# Patient Record
Sex: FEMALE | Race: WHITE | Employment: FULL TIME | URBAN - METROPOLITAN AREA
[De-identification: names, ages, dates, MRNs, and addresses within clinical notes are randomized per-mention and may not be internally consistent; named-entity substitution may affect disease eponyms.]

---

## 2020-12-19 ENCOUNTER — HOSPITAL ENCOUNTER (OUTPATIENT)
Age: 55
Setting detail: SPECIMEN
Discharge: HOME | End: 2020-12-19
Payer: COMMERCIAL

## 2020-12-19 PROCEDURE — U0003 INFECTIOUS AGENT DETECTION BY NUCLEIC ACID (DNA OR RNA); SEVERE ACUTE RESPIRATORY SYNDROME CORONAVIRUS 2 (SARS-COV-2) (CORONAVIRUS DISEASE [COVID-19]), AMPLIFIED PROBE TECHNIQUE, MAKING USE OF HIGH THROUGHPUT TECHNOLOGIES AS DESCRIBED BY CMS-2020-01-R: HCPCS

## 2020-12-19 PROCEDURE — 99201 HC NEW PT, OUTPT VISIT LEVEL 1: CPT

## 2020-12-21 LAB
PERFORMING LAB: ABNORMAL
REPORT: ABNORMAL
SARS-COV-2: DETECTED

## 2022-12-16 ENCOUNTER — HOSPITAL ENCOUNTER (EMERGENCY)
Age: 57
Discharge: HOME | End: 2022-12-16
Payer: COMMERCIAL

## 2022-12-16 VITALS
OXYGEN SATURATION: 100 % | HEART RATE: 96 BPM | RESPIRATION RATE: 18 BRPM | SYSTOLIC BLOOD PRESSURE: 132 MMHG | TEMPERATURE: 100 F | DIASTOLIC BLOOD PRESSURE: 62 MMHG

## 2022-12-16 DIAGNOSIS — J06.9 VIRAL URI: Primary | ICD-10-CM

## 2022-12-16 LAB
FLU A ANTIGEN: NEGATIVE
INFLUENZA B AG, EIA: NEGATIVE

## 2022-12-16 PROCEDURE — 99213 OFFICE O/P EST LOW 20 MIN: CPT

## 2022-12-16 PROCEDURE — 87804 INFLUENZA ASSAY W/OPTIC: CPT

## 2022-12-16 PROCEDURE — 99202 OFFICE O/P NEW SF 15 MIN: CPT | Performed by: NURSE PRACTITIONER

## 2022-12-16 RX ORDER — ONDANSETRON 4 MG/1
4 TABLET, ORALLY DISINTEGRATING ORAL EVERY 8 HOURS PRN
Qty: 9 TABLET | Refills: 0 | Status: SHIPPED | OUTPATIENT
Start: 2022-12-16

## 2022-12-16 RX ORDER — CITALOPRAM 10 MG/1
10 TABLET ORAL DAILY
COMMUNITY

## 2022-12-16 ASSESSMENT — ENCOUNTER SYMPTOMS
NAUSEA: 1
SORE THROAT: 0
SHORTNESS OF BREATH: 0
CHEST TIGHTNESS: 0
VOMITING: 0
EYE REDNESS: 0
COUGH: 1
ABDOMINAL PAIN: 0
EYE ITCHING: 0
DIARRHEA: 0
SINUS PRESSURE: 0

## 2022-12-16 NOTE — ED PROVIDER NOTES
[address] Fritz       Chief Complaint   Patient presents with    Generalized Body Aches    Fever    Nausea       Nurses Notes reviewed and I agree except as noted in the HPI. HISTORY OF PRESENT ILLNESS   Russ Shetty is a 62 y.o. female who presents to the urgent care for evaluation. Flu like symptoms started yesterday. Taking Theraflu as needed. The patient/patient representative has no other acute complaints at this time. REVIEW OF SYSTEMS     Review of Systems   Constitutional:  Positive for fever. Negative for chills and fatigue. HENT:  Negative for congestion, ear pain, sinus pressure and sore throat. Eyes:  Negative for redness and itching. Respiratory:  Positive for cough. Negative for chest tightness and shortness of breath. Cardiovascular:  Negative for chest pain. Gastrointestinal:  Positive for nausea. Negative for abdominal pain, diarrhea and vomiting. Musculoskeletal:  Positive for myalgias. Skin:  Negative for rash. Allergic/Immunologic: Negative for environmental allergies and food allergies. Neurological:  Negative for headaches. Hematological:  Negative for adenopathy. PAST MEDICAL HISTORY         Diagnosis Date    Depression     DVT (deep vein thrombosis) in pregnancy     PTSD (post-traumatic stress disorder)        SURGICAL HISTORY     Patient  has a past surgical history that includes hernia repair and Dilation and curettage of uterus. CURRENT MEDICATIONS       Previous Medications    CITALOPRAM (CELEXA) 10 MG TABLET    Take 10 mg by mouth daily       ALLERGIES     Patient is has No Known Allergies. FAMILY HISTORY     Patient'sfamily history is not on file. SOCIAL HISTORY     Patient  reports that she has been smoking cigarettes. She has a 15.00 pack-year smoking history. She has never used smokeless tobacco. She reports current alcohol use.  She reports that she does not use drugs.    PHYSICAL EXAM     ED TRIAGE VITALS  BP: 134/60, Temp: 100 °F (37.8 °C), Heart Rate: (!) 109, Resp: 20, SpO2: 100 %  Physical Exam  Vitals and nursing note reviewed. Constitutional:       General: She is not in acute distress. Appearance: Normal appearance. She is well-developed and well-groomed. HENT:      Head: Normocephalic and atraumatic. Right Ear: External ear normal.      Left Ear: External ear normal.      Nose: Nose normal.      Mouth/Throat:      Lips: Pink. Mouth: Mucous membranes are moist.   Eyes:      Conjunctiva/sclera: Conjunctivae normal.      Right eye: Right conjunctiva is not injected. Left eye: Left conjunctiva is not injected. Pupils: Pupils are equal.   Cardiovascular:      Rate and Rhythm: Normal rate. Heart sounds: Normal heart sounds. Pulmonary:      Effort: Pulmonary effort is normal. No respiratory distress. Breath sounds: Normal breath sounds. Musculoskeletal:      Cervical back: Normal range of motion. Skin:     General: Skin is warm and dry. Findings: No rash (on exposed surfaces). Neurological:      Mental Status: She is alert and oriented to person, place, and time. Gait: Gait is intact. Psychiatric:         Mood and Affect: Mood normal.         Speech: Speech normal.         Behavior: Behavior is cooperative. DIAGNOSTIC RESULTS   Labs:  Abnormal Labs Reviewed - No abnormal labs to display     IMAGING:  No orders to display     URGENT CARE COURSE:     Vitals:    12/16/22 1126   BP: 134/60   Pulse: (!) 109   Resp: 20   Temp: 100 °F (37.8 °C)   SpO2: 100%       Medications - No data to display  PROCEDURES:  FINALIMPRESSION      1.  Viral URI        DISPOSITION/PLAN   DISPOSITION Decision To Discharge 12/16/2022 11:57:43 AM        ED Course as of 12/16/22 1159   Fri Dec 16, 2022   1155 Flu A Antigen: Negative [HA]   1155 Influenza B Ag, EIA: Negative [HA]      ED Course User Index  [address] [facility], [facility] - [facility] Problem List Items Addressed This Visit    None  Visit Diagnoses       Viral URI    -  Primary    Relevant Medications    ondansetron (ZOFRAN-ODT) 4 MG disintegrating tablet            Physical assessment findings, diagnostic testing(s) if applicable, and vital signs reviewed with patient/patient representative. Differential diagnosis(s) discussed with patient/patient representative. Prescription medications and/or over-the-counter medications for symptom management discussed. Patient is to follow-up with family care provider in 2-3 days if no improvement. If symptoms should worsen or change, go to the ED. Patient/patient representative is aware of care plan, questions answered, verbalizes understanding and is in agreement. Printed instructions attached to after visit summary. If COVID-19 positive or COVID-19 by PCR is pending at time of discharge patient is to quarantine/isolate according to [facility] guidelines. PATIENT REFERRED TO:  [address] [place] [address],[address] [address] [address]  Schedule an appointment as soon as possible for a visit in 3 days  if you do not have a family provider, If symptoms change/worsen, go to the [address] [place] [address], APR - CNP    Please note that some or all of this chart was generated using [employer] voice recognition software. Although every effort was made to ensure the accuracy of this automated transcription, some errors in transcription may have occurred.         LESLIE Hernandez CNP  12/16/22 3160

## 2022-12-16 NOTE — Clinical Note
Joyce Vivas was seen and treated in our emergency department on 12/16/2022. She may return to work on 12/19/2022. If you have any questions or concerns, please don't hesitate to call.       Abhijeet Norton, APRN - CNP

## 2023-01-05 ENCOUNTER — OFFICE VISIT (OUTPATIENT)
Dept: FAMILY MEDICINE CLINIC | Age: 58
End: 2023-01-05
Payer: COMMERCIAL

## 2023-01-05 VITALS
SYSTOLIC BLOOD PRESSURE: 114 MMHG | RESPIRATION RATE: 18 BRPM | OXYGEN SATURATION: 95 % | WEIGHT: 144 LBS | TEMPERATURE: 97.3 F | DIASTOLIC BLOOD PRESSURE: 80 MMHG | HEART RATE: 95 BPM

## 2023-01-05 DIAGNOSIS — S03.00XA DISLOCATION OF TEMPOROMANDIBULAR JOINT, INITIAL ENCOUNTER: ICD-10-CM

## 2023-01-05 DIAGNOSIS — M54.2 NECK PAIN: Primary | ICD-10-CM

## 2023-01-05 PROCEDURE — 99203 OFFICE O/P NEW LOW 30 MIN: CPT | Performed by: STUDENT IN AN ORGANIZED HEALTH CARE EDUCATION/TRAINING PROGRAM

## 2023-01-05 RX ORDER — LATANOPROST 50 UG/ML
SOLUTION/ DROPS OPHTHALMIC
COMMUNITY
Start: 2022-12-07

## 2023-01-05 RX ORDER — FERROUS SULFATE 325(65) MG
TABLET ORAL
COMMUNITY
Start: 2022-12-23

## 2023-01-05 RX ORDER — CHOLECALCIFEROL (VITAMIN D3) 125 MCG
CAPSULE ORAL
COMMUNITY
Start: 2022-12-06

## 2023-01-05 RX ORDER — LORAZEPAM 0.5 MG/1
TABLET ORAL
COMMUNITY
Start: 2022-11-17

## 2023-01-05 RX ORDER — CLINDAMYCIN HYDROCHLORIDE 300 MG/1
CAPSULE ORAL
COMMUNITY
Start: 2022-12-27

## 2023-01-05 SDOH — ECONOMIC STABILITY: FOOD INSECURITY: WITHIN THE PAST 12 MONTHS, YOU WORRIED THAT YOUR FOOD WOULD RUN OUT BEFORE YOU GOT MONEY TO BUY MORE.: NEVER TRUE

## 2023-01-05 SDOH — ECONOMIC STABILITY: TRANSPORTATION INSECURITY
IN THE PAST 12 MONTHS, HAS LACK OF TRANSPORTATION KEPT YOU FROM MEETINGS, WORK, OR FROM GETTING THINGS NEEDED FOR DAILY LIVING?: NO

## 2023-01-05 SDOH — ECONOMIC STABILITY: FOOD INSECURITY: WITHIN THE PAST 12 MONTHS, THE FOOD YOU BOUGHT JUST DIDN'T LAST AND YOU DIDN'T HAVE MONEY TO GET MORE.: NEVER TRUE

## 2023-01-05 SDOH — ECONOMIC STABILITY: TRANSPORTATION INSECURITY
IN THE PAST 12 MONTHS, HAS THE LACK OF TRANSPORTATION KEPT YOU FROM MEDICAL APPOINTMENTS OR FROM GETTING MEDICATIONS?: NO

## 2023-01-05 ASSESSMENT — PATIENT HEALTH QUESTIONNAIRE - PHQ9
2. FEELING DOWN, DEPRESSED OR HOPELESS: 0
SUM OF ALL RESPONSES TO PHQ9 QUESTIONS 1 & 2: 0
SUM OF ALL RESPONSES TO PHQ QUESTIONS 1-9: 0
1. LITTLE INTEREST OR PLEASURE IN DOING THINGS: 0
SUM OF ALL RESPONSES TO PHQ QUESTIONS 1-9: 0

## 2023-01-05 ASSESSMENT — ENCOUNTER SYMPTOMS
CONSTIPATION: 0
DIARRHEA: 0
ABDOMINAL PAIN: 0
SHORTNESS OF BREATH: 0
BACK PAIN: 0
NAUSEA: 0
VOMITING: 0
COUGH: 0
RHINORRHEA: 0

## 2023-01-05 ASSESSMENT — SOCIAL DETERMINANTS OF HEALTH (SDOH): HOW HARD IS IT FOR YOU TO PAY FOR THE VERY BASICS LIKE FOOD, HOUSING, MEDICAL CARE, AND HEATING?: NOT HARD AT ALL

## 2023-01-05 NOTE — PROGRESS NOTES
Deana Quigley (:  1965) is a 62 y.o. female,New patient, here for evaluation of the following chief complaint(s):  Neck Pain (Neck pain x 3 days ) and Other (Problems swallowing and chewing x 3 days - had upper respiratory infection about 4 weeks ago and has felt sick then. )         ASSESSMENT/PLAN:  1. Neck pain  2. Dislocation of temporomandibular joint, initial encounter  70-year-old female presents the office as an acute walk-in for concern of neck pain and inability to open her mouth. Etiology of patient's symptoms consistent with right-sided acute TMJ flare. Patient should continue treatment with clindamycin as scheduled for upper respiratory tract infection. There is no concern for active infection at this time or need for additional work-up/imaging. Patient has pretty significant tenderness and swelling over the right TMJ. Patient was educated on scheduled anti-inflammatory 600 mg ibuprofen 3 times a day for the next week or 2. Plan to follow-up with PCP for reevaluation. Patient may benefit from physical therapy if symptoms not resolved. Patient was educated on signs and symptoms to look out for that would require reevaluation. Patient verbalizes understanding of plan and is agreeable to above. Return if symptoms worsen or fail to improve. Subjective   SUBJECTIVE/OBJECTIVE:  70-year-old female presents the office for a multiple day history of neck discomfort/jaw discomfort. Patient states approximately 4 weeks ago she started out with a viral upper respiratory tract infection with nausea vomiting and fevers which went away but then ultimately symptoms came back even worse resulting in upper respiratory tract infection. Patient was placed on antibiotics for 1 week that did not resolve her symptoms. Patient then went back to the urgent care was treated with a combination of prednisone and clindamycin.   Patient finished the 5 days of prednisone and is still has a couple days left of clindamycin antibiotic. Patient states those symptoms were premature resolving but after stopping the prednisone she started to experience right-sided jaw pain radiating down into her neck. Patient states is gone to the point where it is difficult to open her mouth wide to eat. It also hurts to swallow but denies any sore throat. Patient also has pain when she rotates her head towards the right. Is wondering what could be causing this there is anything that she can do to help resolve the symptoms sooner. Past Medical History:   Diagnosis Date    Depression     DVT (deep vein thrombosis) in pregnancy     PTSD (post-traumatic stress disorder)        Past Surgical History:   Procedure Laterality Date    DILATION AND CURETTAGE OF UTERUS      HERNIA REPAIR         No family history on file.     Social History     Tobacco Use    Smoking status: Former     Packs/day: 0.50     Years: 30.00     Pack years: 15.00     Types: Cigarettes     Quit date: 2022     Years since quittin.0    Smokeless tobacco: Never   Substance Use Topics    Alcohol use: Yes    Drug use: Never         Current Outpatient Medications:     RA VITAMIN D-3 125 MCG (5000 UT) CAPS capsule, take 1 capsule by mouth once daily, Disp: , Rfl:     clindamycin (CLEOCIN) 300 MG capsule, TAKE 1 CAPSULE BY MOUTH EVERY 6 HOURS FOR 14 DAYS, Disp: , Rfl:     latanoprost (XALATAN) 0.005 % ophthalmic solution, , Disp: , Rfl:     LORazepam (ATIVAN) 0.5 MG tablet, take 1 tablet by mouth if needed twice a day for 30 DAYS, Disp: , Rfl:     FEROSUL 325 (65 Fe) MG tablet, take 1 tablet by mouth twice a day, Disp: , Rfl:     citalopram (CELEXA) 10 MG tablet, Take 20 mg by mouth daily, Disp: , Rfl:     ondansetron (ZOFRAN-ODT) 4 MG disintegrating tablet, Place 1 tablet under the tongue every 8 hours as needed for Nausea or Vomiting May sub regular tablet if insurance does not cover ODT., Disp: 9 tablet, Rfl: 0    No Known Allergies    Review of Systems   Constitutional:  Negative for fatigue and fever. HENT:  Negative for congestion, postnasal drip and rhinorrhea. Respiratory:  Negative for cough and shortness of breath. Cardiovascular:  Negative for chest pain and palpitations. Gastrointestinal:  Negative for abdominal pain, constipation, diarrhea, nausea and vomiting. Musculoskeletal:  Positive for neck pain. Negative for back pain. Skin:  Negative for pallor and rash. Neurological:  Negative for dizziness and headaches. Objective   Vitals:    01/05/23 1120   BP: 114/80   Pulse: 95   Resp: 18   Temp: 97.3 °F (36.3 °C)   SpO2: 95%     Physical Exam  Vitals and nursing note reviewed. Constitutional:       General: She is not in acute distress. Appearance: Normal appearance. HENT:      Head: Normocephalic. Right Ear: Tympanic membrane, ear canal and external ear normal. There is no impacted cerumen. Left Ear: Tympanic membrane, ear canal and external ear normal. There is no impacted cerumen. Nose: Nose normal. No congestion or rhinorrhea. Mouth/Throat:      Mouth: Mucous membranes are moist.      Pharynx: Oropharynx is clear. No oropharyngeal exudate or posterior oropharyngeal erythema. Comments: Difficulty opening mouth secondary to right-sided jaw pain. Eyes:      General:         Right eye: No discharge. Left eye: No discharge. Conjunctiva/sclera: Conjunctivae normal.   Neck:      Comments: Patient has tenderness and superficial swelling over right TMJ joint. Patient's TMJ discomfort/tenderness continues inferiorly along eustachian tube. No warmth, erythema of skin, abscess appreciated. Cardiovascular:      Rate and Rhythm: Normal rate and regular rhythm. Pulses: Normal pulses. Heart sounds: No murmur heard. Pulmonary:      Effort: Pulmonary effort is normal. No respiratory distress. Breath sounds: Normal breath sounds.    Musculoskeletal:      Cervical back: Neck supple. Tenderness present. Lymphadenopathy:      Cervical: No cervical adenopathy. Neurological:      Mental Status: She is alert. An electronic signature was used to authenticate this note. --Shiraz Caputo DO          **This report has been created using voice recognition software. It may contain minor errors which are inherent in voice recognition technology. **